# Patient Record
Sex: FEMALE | Race: WHITE | NOT HISPANIC OR LATINO | URBAN - METROPOLITAN AREA
[De-identification: names, ages, dates, MRNs, and addresses within clinical notes are randomized per-mention and may not be internally consistent; named-entity substitution may affect disease eponyms.]

---

## 2018-02-05 ENCOUNTER — IMPORTED ENCOUNTER (OUTPATIENT)
Dept: URBAN - METROPOLITAN AREA CLINIC 9 | Facility: CLINIC | Age: 78
End: 2018-02-05

## 2019-04-09 ENCOUNTER — IMPORTED ENCOUNTER (OUTPATIENT)
Dept: URBAN - METROPOLITAN AREA CLINIC 9 | Facility: CLINIC | Age: 79
End: 2019-04-09

## 2019-05-02 ENCOUNTER — IMPORTED ENCOUNTER (OUTPATIENT)
Dept: URBAN - METROPOLITAN AREA CLINIC 9 | Facility: CLINIC | Age: 79
End: 2019-05-02

## 2019-06-27 ENCOUNTER — IMPORTED ENCOUNTER (OUTPATIENT)
Dept: URBAN - METROPOLITAN AREA CLINIC 9 | Facility: CLINIC | Age: 79
End: 2019-06-27

## 2019-07-25 ENCOUNTER — IMPORTED ENCOUNTER (OUTPATIENT)
Dept: URBAN - METROPOLITAN AREA CLINIC 9 | Facility: CLINIC | Age: 79
End: 2019-07-25

## 2020-05-26 ENCOUNTER — IMPORTED ENCOUNTER (OUTPATIENT)
Dept: URBAN - METROPOLITAN AREA CLINIC 9 | Facility: CLINIC | Age: 80
End: 2020-05-26

## 2020-11-23 ENCOUNTER — IMPORTED ENCOUNTER (OUTPATIENT)
Dept: URBAN - METROPOLITAN AREA CLINIC 9 | Facility: CLINIC | Age: 80
End: 2020-11-23

## 2021-02-15 NOTE — PATIENT DISCUSSION
Patient informed of available non-opioid medications and other non-pharmacological options. Discussed advantages and disadvantages of the alternatives, including whether the patient is at-risk for, or has a history of, controlled substance abuse or misuse, and the patient’s personal preferences. [facility] “Opioid Alternative Pamphlet” was provided to patient.

## 2021-02-22 NOTE — PATIENT DISCUSSION
One week post op: lids in good position, no infection, healing well. Wear sunglasses and hat while outdoors. Avoid sun exposure.  Ointment/Tobradex bid x 1 week then qhs x 1 week. No restrictions in 5 days.

## 2021-02-22 NOTE — PATIENT DISCUSSION
One week post op: great curve and shape, no infection, healing well, sutures intact and removed. Ointment bid x 1 week, the qhs x 1 week. Wear sunglasses and hat while outdoors. Avoid sun exposure. No restrictions in 5 days.

## 2021-05-04 ENCOUNTER — IMPORTED ENCOUNTER (OUTPATIENT)
Dept: URBAN - METROPOLITAN AREA CLINIC 9 | Facility: CLINIC | Age: 81
End: 2021-05-04

## 2021-05-04 PROBLEM — E11.9: Noted: 2021-05-04

## 2021-05-04 PROBLEM — H35.3131: Noted: 2021-05-04

## 2021-05-04 PROBLEM — H04.213: Noted: 2021-05-04

## 2021-05-04 PROBLEM — H01.021: Noted: 2021-05-04

## 2021-05-04 PROBLEM — H01.024: Noted: 2021-05-04

## 2021-09-02 NOTE — PATIENT DISCUSSION
D/c ointment RLL, continue ointment on LLL qhs x 1 week. Wait 2 wks on contacts, next week Wed exercise.

## 2021-09-02 NOTE — PATIENT DISCUSSION
D/c ointment RLL, continue ointment on LLL qhs x 1 week. Wait 2 wks on contacts, next week Wed exercise. OS has more almond shaped appearance as pt prefers.

## 2021-09-08 NOTE — PATIENT DISCUSSION
D/c ointment RLL, continue ointment on LLL qhs x 1 week. Wait 1 more week on contacts and exercise. OS has more almond shaped appearance as pt prefers. Recommend patient follow up with [facility] next week.

## 2021-09-16 NOTE — PATIENT DISCUSSION
Suture dehiscence LLL corner, slight gap from split stitch. Recommend tuck stitch sx under local, in November. Needs 2-3 months to heal before sx.

## 2021-10-19 ASSESSMENT — TONOMETRY
OD_IOP_MMHG: 10
OS_IOP_MMHG: 14
OD_IOP_MMHG: 11
OS_IOP_MMHG: 11
OD_IOP_MMHG: 11
OD_IOP_MMHG: 13
OS_IOP_MMHG: 12
OS_IOP_MMHG: 12
OD_IOP_MMHG: 12
OS_IOP_MMHG: 11

## 2021-10-19 ASSESSMENT — VISUAL ACUITY
OD_SC: 20/40 SN
OD_SC: 20/25 - SN
OS_SC: 20/25 + SN
OS_SC: 20/25 SN
OD_SC: 20/30 - SN
OS_SC: 20/20 SN
OS_SC: 20/25 -2 SN
OD_SC: 20/30 -2 SN
OD_SC: 20/30 -2 SN
OD_SC: 20/20 SN
OS_SC: 20/30 -2 SN
OD_SC: 20/25 SN
OS_SC: 20/40 - SN
OS_SC: 20/25 + SN
OD_SC: 20/40 - SN
OS_SC: 20/20 SN

## 2021-11-08 ENCOUNTER — ESTABLISHED COMPREHENSIVE EXAM (OUTPATIENT)
Dept: URBAN - METROPOLITAN AREA CLINIC 17 | Facility: CLINIC | Age: 81
End: 2021-11-08

## 2021-11-08 DIAGNOSIS — E11.9: ICD-10-CM

## 2021-11-08 DIAGNOSIS — H04.123: ICD-10-CM

## 2021-11-08 DIAGNOSIS — H01.024: ICD-10-CM

## 2021-11-08 DIAGNOSIS — H35.3131: ICD-10-CM

## 2021-11-08 DIAGNOSIS — H04.213: ICD-10-CM

## 2021-11-08 DIAGNOSIS — H01.021: ICD-10-CM

## 2021-11-08 PROCEDURE — 92134 CPTRZ OPH DX IMG PST SGM RTA: CPT

## 2021-11-08 PROCEDURE — 99214 OFFICE O/P EST MOD 30 MIN: CPT

## 2021-11-08 ASSESSMENT — VISUAL ACUITY
OD_SC: 20/25
OS_SC: 20/25

## 2021-11-08 ASSESSMENT — TONOMETRY
OS_IOP_MMHG: 12
OD_IOP_MMHG: 13

## 2021-11-15 NOTE — PATIENT DISCUSSION
Recommend Mini lower lift, post-tragel, SMAS to cheeks(discussed risks and benefits of sx. .. ). [Negative] : Heme/Lymph

## 2022-11-22 ENCOUNTER — ESTABLISHED PATIENT (OUTPATIENT)
Dept: URBAN - METROPOLITAN AREA CLINIC 17 | Facility: CLINIC | Age: 82
End: 2022-11-22

## 2022-11-22 DIAGNOSIS — H01.024: ICD-10-CM

## 2022-11-22 DIAGNOSIS — H04.213: ICD-10-CM

## 2022-11-22 DIAGNOSIS — H35.3131: ICD-10-CM

## 2022-11-22 DIAGNOSIS — H01.021: ICD-10-CM

## 2022-11-22 DIAGNOSIS — H04.123: ICD-10-CM

## 2022-11-22 DIAGNOSIS — E11.9: ICD-10-CM

## 2022-11-22 PROCEDURE — 92014 COMPRE OPH EXAM EST PT 1/>: CPT

## 2022-11-22 PROCEDURE — 92134 CPTRZ OPH DX IMG PST SGM RTA: CPT

## 2022-11-22 ASSESSMENT — TONOMETRY
OS_IOP_MMHG: 12
OD_IOP_MMHG: 12

## 2022-11-22 ASSESSMENT — VISUAL ACUITY
OS_SC: 20/20-2
OD_SC: 20/25-2

## 2023-12-13 ENCOUNTER — ESTABLISHED PATIENT (OUTPATIENT)
Dept: URBAN - METROPOLITAN AREA CLINIC 17 | Facility: CLINIC | Age: 83
End: 2023-12-13

## 2023-12-13 DIAGNOSIS — H01.024: ICD-10-CM

## 2023-12-13 DIAGNOSIS — H04.123: ICD-10-CM

## 2023-12-13 DIAGNOSIS — E11.9: ICD-10-CM

## 2023-12-13 DIAGNOSIS — H35.3131: ICD-10-CM

## 2023-12-13 DIAGNOSIS — H01.021: ICD-10-CM

## 2023-12-13 DIAGNOSIS — H04.213: ICD-10-CM

## 2023-12-13 PROCEDURE — 92014 COMPRE OPH EXAM EST PT 1/>: CPT

## 2023-12-13 PROCEDURE — 92134 CPTRZ OPH DX IMG PST SGM RTA: CPT

## 2023-12-13 ASSESSMENT — VISUAL ACUITY
OD_SC: 20/25-1
OS_SC: 20/30

## 2023-12-13 ASSESSMENT — TONOMETRY
OS_IOP_MMHG: 10
OD_IOP_MMHG: 10

## 2024-06-18 ENCOUNTER — ESTABLISHED PATIENT (OUTPATIENT)
Dept: URBAN - METROPOLITAN AREA CLINIC 17 | Facility: CLINIC | Age: 84
End: 2024-06-18

## 2024-06-18 DIAGNOSIS — E11.9: ICD-10-CM

## 2024-06-18 DIAGNOSIS — H04.123: ICD-10-CM

## 2024-06-18 DIAGNOSIS — H35.3131: ICD-10-CM

## 2024-06-18 PROCEDURE — 92014 COMPRE OPH EXAM EST PT 1/>: CPT

## 2024-06-18 PROCEDURE — 92134 CPTRZ OPH DX IMG PST SGM RTA: CPT

## 2024-06-18 ASSESSMENT — TONOMETRY
OD_IOP_MMHG: 10
OS_IOP_MMHG: 12

## 2024-06-18 ASSESSMENT — VISUAL ACUITY
OD_SC: 20/20
OS_SC: 20/25

## 2025-03-18 NOTE — PATIENT DISCUSSION
Recommend N/C touch up LLL canthoplasty under local to give a more almond shaped eye as pt prefers. Refill request, med pended.